# Patient Record
Sex: FEMALE | Race: WHITE | URBAN - METROPOLITAN AREA
[De-identification: names, ages, dates, MRNs, and addresses within clinical notes are randomized per-mention and may not be internally consistent; named-entity substitution may affect disease eponyms.]

---

## 2017-01-23 ENCOUNTER — TELEPHONE (OUTPATIENT)
Dept: FAMILY MEDICINE CLINIC | Facility: CLINIC | Age: 72
End: 2017-01-23

## 2017-05-17 ENCOUNTER — TELEPHONE (OUTPATIENT)
Dept: FAMILY MEDICINE CLINIC | Facility: CLINIC | Age: 72
End: 2017-05-17

## 2018-05-21 ENCOUNTER — PATIENT OUTREACH (OUTPATIENT)
Dept: FAMILY MEDICINE CLINIC | Facility: CLINIC | Age: 73
End: 2018-05-21

## 2018-05-22 NOTE — PROGRESS NOTES
Please call pt to inquire if pt has had a colonoscopy in the last 10 years and if so who performed it (name and phone #). Please let Ken Sibley know so I can obtain the report.     If pt did not have a colonoscopy please advise them we will leave the FIT stoo

## 2019-01-20 ENCOUNTER — PATIENT OUTREACH (OUTPATIENT)
Dept: FAMILY MEDICINE CLINIC | Facility: CLINIC | Age: 74
End: 2019-01-20

## 2019-01-20 NOTE — PROGRESS NOTES
Patient's LOV 07/2016 with Dr. Brenna Baldwin. In reviewing patient's chart patient has seen Dr. Yeimy Ramirez (2016). Please find out if Dr. Brenna Baldwin is still patient's PCP- if yes, she needs to follow up in the office with him.  If she is under the care of another

## 2019-01-23 NOTE — PROGRESS NOTES
Called number on file. Spoke to pt granddaughter who stated that she does not have contact with pt or contact information. Stated that pt moved to [place].

## (undated) NOTE — LETTER
6/12/2018    [address] [id number]    Dear Ms. Sabiha Brown office has been trying to address important healthcare needs. It is important that you contact our office at your earliest convenience.     Also,  Did you know